# Patient Record
Sex: MALE | Race: BLACK OR AFRICAN AMERICAN | NOT HISPANIC OR LATINO | URBAN - NONMETROPOLITAN AREA
[De-identification: names, ages, dates, MRNs, and addresses within clinical notes are randomized per-mention and may not be internally consistent; named-entity substitution may affect disease eponyms.]

---

## 2019-11-22 ENCOUNTER — IMPORTED ENCOUNTER (OUTPATIENT)
Dept: URBAN - NONMETROPOLITAN AREA CLINIC 1 | Facility: CLINIC | Age: 49
End: 2019-11-22

## 2019-11-22 PROBLEM — H25.813: Noted: 2019-11-22

## 2019-11-22 PROBLEM — H16.223: Noted: 2019-11-22

## 2019-11-22 PROBLEM — H52.4: Noted: 2019-11-22

## 2019-11-22 PROCEDURE — 92014 COMPRE OPH EXAM EST PT 1/>: CPT

## 2019-11-22 PROCEDURE — 92015 DETERMINE REFRACTIVE STATE: CPT

## 2019-11-22 NOTE — PATIENT DISCUSSION
Presbyopia OUDiscussed refractive status in detail with patient. New glasses Rx given today. Continue to monitor. GOOD OUDiscussed diagnosis in detail with patientDiscussed signs and symptoms associated Recommend using AT's throughout the day ( Refresh Relieva and Lumify)Continue to monitor Combined Cataract OUDiscussed diagnosis with patient. Reviewed symptoms related to cataract progression. Discussed various treatment options with patient. No treatment required at this time. Continue to monitor.; 's Notes: MR  11/22/19DFE 7/23/18

## 2020-02-11 NOTE — PROCEDURE NOTE: CLINICAL
PROCEDURE NOTE: Chalazion Injection Risks, benefits and alternatives were discussed. The patient desires to proceed with an injection of Kenalog 40mg into the chalazion. They understand they may be bruised and swollen for several days. The involved area was prepped using an alcohol wipe. 0.2 cc’s of Kenalog were injected into the chalazion. The patient tolerated the procedure well and left in good condition. The patient understands to call for any concerns. Singh Rosario

## 2020-02-11 NOTE — PATIENT DISCUSSION
Surgical vs nonsurgical treatment options were discussed. Pt would like to try steroid injection. Consent signed. Photos taken. 1 month follow up. Discussed with patient to let us know ahead of time if I&D is needed, because we will need to get preservative free anesthetic for chalazion excision procedure.

## 2020-12-04 ENCOUNTER — PREPPED CHART (OUTPATIENT)
Dept: URBAN - NONMETROPOLITAN AREA CLINIC 1 | Facility: CLINIC | Age: 50
End: 2020-12-04

## 2020-12-04 ENCOUNTER — IMPORTED ENCOUNTER (OUTPATIENT)
Dept: URBAN - NONMETROPOLITAN AREA CLINIC 1 | Facility: CLINIC | Age: 50
End: 2020-12-04

## 2020-12-04 PROCEDURE — 92015 DETERMINE REFRACTIVE STATE: CPT

## 2020-12-04 PROCEDURE — 92014 COMPRE OPH EXAM EST PT 1/>: CPT

## 2020-12-04 NOTE — PATIENT DISCUSSION
Presbyopia OUDiscussed refractive status in detail with patient. New glasses Rx given today. Continue to monitor. GOOD OUDiscussed diagnosis in detail with patientDiscussed signs and symptoms associated Recommend using AT's throughout the day ( Refresh Relieva and Lumify)Continue to monitor Combined Cataract OUDiscussed diagnosis with patient. Reviewed symptoms related to cataract progression. Discussed various treatment options with patient. No treatment required at this time. Continue to monitor.

## 2020-12-04 NOTE — PATIENT DISCUSSION
Discussed diagnosis in detail with patient. Reviewed symptoms related to cataract progression. No treatment required at this time. Continue to monitor.

## 2022-01-26 ASSESSMENT — VISUAL ACUITY
OD_SC: 20/25
OS_SC: 20/20

## 2022-01-26 ASSESSMENT — TONOMETRY
OS_IOP_MMHG: 18
OD_IOP_MMHG: 18

## 2022-01-27 ENCOUNTER — ESTABLISHED PATIENT (OUTPATIENT)
Dept: URBAN - NONMETROPOLITAN AREA CLINIC 1 | Facility: CLINIC | Age: 52
End: 2022-01-27

## 2022-01-27 DIAGNOSIS — H52.4: ICD-10-CM

## 2022-01-27 PROCEDURE — 92015 DETERMINE REFRACTIVE STATE: CPT

## 2022-01-27 PROCEDURE — 92014 COMPRE OPH EXAM EST PT 1/>: CPT

## 2022-01-27 ASSESSMENT — TONOMETRY
OD_IOP_MMHG: 14
OS_IOP_MMHG: 13

## 2022-01-27 ASSESSMENT — VISUAL ACUITY
OS_SC: 20/20
OD_SC: 20/20

## 2022-04-10 ASSESSMENT — TONOMETRY
OD_IOP_MMHG: 18
OS_IOP_MMHG: 18
OS_IOP_MMHG: 18
OD_IOP_MMHG: 18

## 2022-04-10 ASSESSMENT — VISUAL ACUITY
OS_CC: 20/20-1
OS_CC: 20/20
OD_CC: 20/25
OD_CC: 20/25

## 2023-07-10 ENCOUNTER — ESTABLISHED PATIENT (OUTPATIENT)
Dept: URBAN - NONMETROPOLITAN AREA CLINIC 1 | Facility: CLINIC | Age: 53
End: 2023-07-10

## 2023-07-10 DIAGNOSIS — H52.4: ICD-10-CM

## 2023-07-10 PROCEDURE — 92014 COMPRE OPH EXAM EST PT 1/>: CPT

## 2023-07-10 PROCEDURE — 92015 DETERMINE REFRACTIVE STATE: CPT

## 2023-07-10 ASSESSMENT — TONOMETRY
OS_IOP_MMHG: 15
OD_IOP_MMHG: 15

## 2023-07-10 ASSESSMENT — VISUAL ACUITY
OS_CC: 20/25
OU_CC: 20/20-2
OD_CC: 20/25-1

## 2024-07-12 ENCOUNTER — COMPREHENSIVE EXAM (OUTPATIENT)
Dept: URBAN - NONMETROPOLITAN AREA CLINIC 1 | Facility: CLINIC | Age: 54
End: 2024-07-12

## 2024-07-12 DIAGNOSIS — H52.4: ICD-10-CM

## 2024-07-12 PROCEDURE — 92015 DETERMINE REFRACTIVE STATE: CPT

## 2024-07-12 PROCEDURE — 92014 COMPRE OPH EXAM EST PT 1/>: CPT

## 2024-07-12 ASSESSMENT — TONOMETRY
OD_IOP_MMHG: 15
OS_IOP_MMHG: 15

## 2024-07-12 ASSESSMENT — VISUAL ACUITY
OD_CC: 20/20-
OS_CC: 20/25-2
OU_CC: 20/20

## 2025-07-28 ENCOUNTER — COMPREHENSIVE EXAM (OUTPATIENT)
Age: 55
End: 2025-07-28

## 2025-07-28 DIAGNOSIS — H52.4: ICD-10-CM

## 2025-07-28 PROCEDURE — 92015 DETERMINE REFRACTIVE STATE: CPT

## 2025-07-28 PROCEDURE — 92014 COMPRE OPH EXAM EST PT 1/>: CPT
